# Patient Record
Sex: FEMALE | Race: WHITE
[De-identification: names, ages, dates, MRNs, and addresses within clinical notes are randomized per-mention and may not be internally consistent; named-entity substitution may affect disease eponyms.]

---

## 2018-01-02 ENCOUNTER — HOSPITAL ENCOUNTER (EMERGENCY)
Dept: HOSPITAL 72 - EMR | Age: 78
Discharge: HOME | End: 2018-01-02
Payer: MEDICARE

## 2018-01-02 VITALS — HEIGHT: 65 IN | BODY MASS INDEX: 18.33 KG/M2 | WEIGHT: 110 LBS

## 2018-01-02 VITALS — SYSTOLIC BLOOD PRESSURE: 124 MMHG | DIASTOLIC BLOOD PRESSURE: 76 MMHG

## 2018-01-02 DIAGNOSIS — J18.9: Primary | ICD-10-CM

## 2018-01-02 DIAGNOSIS — M79.7: ICD-10-CM

## 2018-01-02 DIAGNOSIS — G20: ICD-10-CM

## 2018-01-02 DIAGNOSIS — Z88.8: ICD-10-CM

## 2018-01-02 PROCEDURE — 99284 EMERGENCY DEPT VISIT MOD MDM: CPT

## 2018-01-02 PROCEDURE — 71045 X-RAY EXAM CHEST 1 VIEW: CPT

## 2018-01-02 NOTE — EMERGENCY ROOM REPORT
History of Present Illness


General


Chief Complaint:  Upper Respiratory Illness


Source:  Patient


 (JOSE E DAMON)





Present Illness


HPI


The patient is a 78 yo female presenting for cough and subjective fever for the 

past 2 weeks.  She states that she went to another facility and was prescribed 

amoxicillin which ended one week prior.  She states that this initially helped 

and then symptoms returned.  She denies any known sick contacts recent travel.  

She states that she is having difficulty sleeping due to to coughing as well as 

chest pain which occurs with cough only.  Described as a 5/10 dull ache.  She 

denies any other symptoms including shortness of breath, hemoptysis, abdominal 

pain, rash, dizziness, myalgia


 (JOSE E DAMON)


Allergies:  


Coded Allergies:  


     AMOXICILLIN (Verified  Allergy, Severe, RASHES ARMS/CHEST, 6/22/16)


     CLAVULANIC ACID (Verified  Allergy, Severe, RASHES ARMS/CHEST, 6/22/16)


     LEVOFLOXACIN (Verified  Allergy, Severe, RASHES ARMS/CHEST, 6/22/16)


     NITROFURANTOIN (Verified  Allergy, Unknown, 1/2/18)


Uncoded Allergies:  


     SHELLFISH (Allergy, Unknown, Rash, 10/16/16)





Patient History


Past Medical History:  see triage record


Pertinent Family History:  none


Last Menstrual Period:  Post


Reviewed Nursing Documentation:  PMH: Agreed, PSxH: Agreed (JOSE E DAMON)





Nursing Documentation-PMH


Hx Cancer:  No


Hx Gastrointestinal Problems:  Yes - LACTOSE INTOLERANCE


Hx Neurological Problems:  No - Fibromyalgia, Parkinson's


 (JOSE E DAMON)





Review of Systems


All Other Systems:  negative except mentioned in HPI


 (JOSE E DAMON)





Physical Exam





Vital Signs








  Date Time  Temp Pulse Resp B/P (MAP) Pulse Ox O2 Delivery O2 Flow Rate FiO2


 


1/2/18 17:01 99.1 93 19 122/77 98   


 


1/2/18 17:30      Room Air  98








Sp02 EP Interpretation:  reviewed, normal


General Appearance:  no apparent distress, alert, GCS 15, non-toxic


Head:  normocephalic, atraumatic


Eyes:  bilateral eye normal inspection, bilateral eye PERRL


ENT:  hearing grossly normal, normal pharynx, no angioedema, normal voice


Neck:  full range of motion, supple/symm/no masses


Respiratory:  chest non-tender, lungs clear, normal breath sounds, no wheezing, 

speaking full sentences


Cardiovascular #1:  regular rate, rhythm, no edema


Musculoskeletal:  back normal, gait/station normal, normal range of motion, non-

tender


Neurologic:  alert, oriented x3, responsive, motor strength/tone normal, 

sensory intact, speech normal


Psychiatric:  judgement/insight normal, memory normal, mood/affect normal, no 

suicidal/homicidal ideation


Skin:  normal color, no rash, warm/dry, well hydrated


Lymphatic:  no adenopathy


 (JOSE E DAMON P.AJason)





Medical Decision Making


PA Attestation


Dr. Gan is my supervising physician. Patient management was discussed with 

my supervising physician


 (JOSE E DAMON)


Diagnostic Impression:  


 Primary Impression:  


 Atypical pneumonia


ER Course


The patient is a 78 yo female presenting for cough and subjective fever for the 

past 2 weeks. 





Differential diagnosis include but not limited to pharyngitis, sinusitis, AOM, 

bronchitis, PNA





PE: Afebrile. NAD


HEENT unremarkable


Lungs CTA bilat. 





Chest x-ray unremarkable.  No acute findings.





The patient will be treated with a prescription for azithromycin and cough 

medication and will followup with her primary doctor. Will treat for pneumonia 

given patient's age, subjective fever, and length of symptoms. 





ER precautions given


 (JOSE E DAMON P.A.)





Chest X-Ray Diagnostic Results


Chest X-Ray Diagnostic Results :  


   Chest X-Ray Ordered:  Yes


   # of Views/Limited/Complete:  1 View


   Indication:  Other - cough


   EP Interpretation:  Yes


   PA Xray:  Interpretation reviewed, by supervising MD, and agrees with 

findings.


   Interpretation:  no consolidation, no effusion, no pneumothorax


   Impression:  No acute disease


   Electronically Signed by:  Jose E Damon PA-C


 (JOSE E DAMON P.A.)


Chest X-Ray Diagnostic Results :  


   Electronically Signed by:  [place] documentation reviewed by me and is 

accurate, Boyd Gan MD.


 (Boyd Gan M.D.)





Last Vital Signs








  Date Time  Temp Pulse Resp B/P (MAP) Pulse Ox O2 Delivery O2 Flow Rate FiO2


 


1/2/18 18:30 98.8 76 20 124/76 99 Room Air  


 


1/2/18 17:30        98








Status:  improved


 (JOSE E DAMON P.A.)


Disposition:  HOME, SELF-CARE


Condition:  Improved


Scripts


Codeine/Promethazine Hcl* (PROMETHAZINE-CODEINE SYRUP*) 118 Ml Syrup


5 ML ORAL Q6H Y for For Cough, #118 ML 0 Refills


   Prov: JOSE E DAMON         1/2/18 


Azithromycin* (ZITHROMAX*) 250 Mg Tablet


250 MG ORAL DAILY, #6 TAB 0 Refills


   Take two tables once daily for 1 day, then one tablet once daily for 4


   days.


   Prov: JOSE E DAMON         1/2/18


Referrals:  


RONNY ALEX (PCP)


Patient Instructions:  Cough, Adult





Additional Instructions:  


I discussed my findings with the patient. All questions and concerns have been 

answered. Treatment and medication compliance have been addressed. I advised 

the patient that they need to follow up with PMD in 3-5 days. Return to ED if 

pain remains or worsens, cough worsens or remains, you notice blood in your 

sputum, you notice wheezing, you experience a fever, or if needed for any 

reason. Patient verbalized understanding of discharge instructions.











JOSE E DAMON Jan 2, 2018 21:38


Boyd Gan M.D. Chad 3, 2018 04:38

## 2018-01-03 NOTE — DIAGNOSTIC IMAGING REPORT
Indication: Cough

 

Technique: One view of the chest

 

Comparison: none

 

Findings: Lungs and pleural spaces are clear. Heart size is normal. Left

hemidiaphragm is mildly elevated

 

Impression: No acute process

## 2018-01-13 ENCOUNTER — HOSPITAL ENCOUNTER (EMERGENCY)
Dept: HOSPITAL 72 - EMR | Age: 78
Discharge: HOME | End: 2018-01-13
Payer: MEDICARE

## 2018-01-13 VITALS — WEIGHT: 110 LBS | HEIGHT: 64 IN | BODY MASS INDEX: 18.78 KG/M2

## 2018-01-13 VITALS — SYSTOLIC BLOOD PRESSURE: 153 MMHG | DIASTOLIC BLOOD PRESSURE: 81 MMHG

## 2018-01-13 DIAGNOSIS — N39.0: ICD-10-CM

## 2018-01-13 DIAGNOSIS — R07.89: Primary | ICD-10-CM

## 2018-01-13 DIAGNOSIS — M79.7: ICD-10-CM

## 2018-01-13 DIAGNOSIS — G20: ICD-10-CM

## 2018-01-13 LAB
APPEARANCE UR: CLEAR
APTT PPP: (no result) S
GLUCOSE UR STRIP-MCNC: NEGATIVE MG/DL
KETONES UR QL STRIP: NEGATIVE
LEUKOCYTE ESTERASE UR QL STRIP: (no result)
NITRITE UR QL STRIP: NEGATIVE
PH UR STRIP: 7 [PH] (ref 4.5–8)
PROT UR QL STRIP: NEGATIVE
SP GR UR STRIP: 1.01 (ref 1–1.03)
UROBILINOGEN UR-MCNC: NORMAL MG/DL (ref 0–1)

## 2018-01-13 PROCEDURE — 71045 X-RAY EXAM CHEST 1 VIEW: CPT

## 2018-01-13 PROCEDURE — 81003 URINALYSIS AUTO W/O SCOPE: CPT

## 2018-01-13 PROCEDURE — 87181 SC STD AGAR DILUTION PER AGT: CPT

## 2018-01-13 PROCEDURE — 93005 ELECTROCARDIOGRAM TRACING: CPT

## 2018-01-13 PROCEDURE — 87086 URINE CULTURE/COLONY COUNT: CPT

## 2018-01-13 PROCEDURE — 99283 EMERGENCY DEPT VISIT LOW MDM: CPT

## 2018-01-13 NOTE — EMERGENCY ROOM REPORT
History of Present Illness


General


Chief Complaint:  Chest Pain


Source:  Patient





Present Illness


HPI


78 yo female, history of fibromyalgia, presenting with right-sided rib pain.  

Patient was seen in the emergency room about a week ago, was diagnosed with 

pneumonia and was given azithromycin.  Patient states that her fever chills are 

gone.  No runny nose.  Cough has improved.  However now has right-sided rib 

pain she believes from the cough.  Pain is worse with movement and deep 

inspiration.  No shortness of breath.  Has been eating and drinking normally.  

Has a caregiver.also states has had urinary frequency


Allergies:  


Coded Allergies:  


     AMOXICILLIN (Verified  Allergy, Severe, RASHES ARMS/CHEST, 6/22/16)


     CLAVULANIC ACID (Verified  Allergy, Severe, RASHES ARMS/CHEST, 6/22/16)


     LEVOFLOXACIN (Verified  Allergy, Severe, RASHES ARMS/CHEST, 6/22/16)


     NITROFURANTOIN (Verified  Allergy, Unknown, 1/2/18)


Uncoded Allergies:  


     SHELLFISH (Allergy, Unknown, Rash, 10/16/16)





Patient History


Past Medical History:  see triage record


Past Surgical History:  none


Pertinent Family History:  none


Pregnant Now:  No


Reviewed Nursing Documentation:  PMH: Agreed, PSxH: Agreed





Nursing Documentation-PMH


Past Medical History:  No History, Except For


Hx Cancer:  No


Hx Gastrointestinal Problems:  Yes - LACTOSE INTOLERANCE


Hx Neurological Problems:  No - Fibromyalgia, Parkinson's





Review of Systems


All Other Systems:  negative except mentioned in HPI





Physical Exam





Vital Signs








  Date Time  Temp Pulse Resp B/P (MAP) Pulse Ox O2 Delivery O2 Flow Rate FiO2


 


1/13/18 19:16 99.0 93 15 155/87 99 Room Air  








Sp02 EP Interpretation:  reviewed, normal


General Appearance:  other - thin elderly female, appears to be in some pain, 

however speaking complete sentences not in respiratory distress


Head:  normocephalic, atraumatic


Eyes:  bilateral eye normal inspection, bilateral eye PERRL, bilateral eye EOMI


ENT:  normal ENT inspection, normal pharynx, normal voice, moist mucus membranes


Neck:  normal inspection, full range of motion, supple


Respiratory:  normal inspection, lungs clear, normal breath sounds, no 

respiratory distress, no retraction, no wheezing, speaking full sentences, 

chest symmetrical


Cardiovascular #1:  normal inspection, regular rate, rhythm, normal capillary 

refill


Cardiovascular #2:  2+ radial (R), 2+ radial (L)


Gastrointestinal:  normal inspection, non tender, soft, non-distended, no 

guarding


Musculoskeletal:  back normal, normal range of motion, other - +r sided rib 

tenderness


Neurologic:  normal inspection, alert, oriented x3, responsive, motor strength/

tone normal, sensory intact, normal gait, speech normal


Psychiatric:  normal inspection, judgement/insight normal, memory normal


Skin:  normal inspection, normal color, no rash, warm/dry, well hydrated, 

normal turgor





Medical Decision Making


Diagnostic Impression:  


 Primary Impression:  


 Muscular chest pain


 Additional Impression:  


 Urinary tract infection


ER Course


77-year-old female  p/w R rib pain worse w movement





DDX: 


likely musculoskeletal pain





Plan:


pan control, ekg, cxr





ER course:


feels better with meds. also with UTI


she remains nontoxic appearing


not in pain


VSS


dc home





Disposition:


Patient will be discharged to home with caregiver


Strict precautions discussed with patient on when to emergently return to the ED

: this includes worsening/severe chest pain, palpitations, shortness of breath, 

syncopal episodes, fever or chills, which may indicate severe illness. Patient 

verbalized understanding.


Patient instructed to follow up with their PMD within the next 2 days. 


Patient also instructed to follow up with a cardiologist within 2 days for 

possible outpatient stress test. Patient agrees with plan.





Please note that this Emergency Department Report was dictated using [employer] technology software, occasionally this can lead to 

erroneous entry secondary to interpretation by the dictation equipment.





EKG Diagnostic Results


EP Interpretation: Yes


Rate: normal


Rhythm: NSR


ST Segments: No acute changes 


ASA given to patient: no





Rhythm Strip


EP Interpretation: Yes


Rate: 80


Rhythm: NSR, no PVCs, no ectopy





Chest X-ray


CXR: Ordered: Yes


1 view


Indication: Chest pain


EP interpretation: Yes


Interpretation: No consolidation, no effusion, no PTX, no acute cardiopulmonary 

disease


Impression: No acute disease





Electronically signed by Ana Merrill MD





Laboratory Tests








Test


  1/13/18


20:37


 


Urine Color Pale yellow  


 


Urine Appearance Clear  


 


Urine pH 7 (4.5-8.0)  


 


Urine Specific Gravity


  1.010


(1.005-1.035)


 


Urine Protein


  Negative


(NEGATIVE)


 


Urine Glucose (UA)


  Negative


(NEGATIVE)


 


Urine Ketones


  Negative


(NEGATIVE)


 


Urine Occult Blood


  Negative


(NEGATIVE)


 


Urine Nitrite


  Negative


(NEGATIVE)


 


Urine Bilirubin


  Negative


(NEGATIVE)


 


Urine Urobilinogen


  Normal MG/DL


(0.0-1.0)


 


Urine Leukocyte Esterase


  1+ (NEGATIVE)


H


 


Urine RBC


  0-2 /HPF (0 -


2)


 


Urine WBC


  2-4 /HPF (0 -


2)


 


Urine Squamous Epithelial


Cells None /LPF


(NONE/OCC)


 


Urine Amorphous Sediment


  Moderate /LPF


(NONE)  H


 


Urine Bacteria


  Moderate /HPF


(NONE)  H











Last Vital Signs








  Date Time  Temp Pulse Resp B/P (MAP) Pulse Ox O2 Delivery O2 Flow Rate FiO2


 


1/13/18 19:16 99.0 93 15 155/87 99 Room Air  








Disposition:  HOME, SELF-CARE


Condition:  Improved


Scripts


Benzonatate* (TESSALON PERLE*) 100 Mg Capsule


7 MG ORAL THREE TIMES A DAY, #21 PERLE


   Prov: Ana Merrill M.D.         1/13/18 


Cephalexin* (KEFLEX*) 500 Mg Capsule


500 MG ORAL Q6H for 7 Days, #28 CAP 0 Refills


   Prov: Ana Merrill M.D.         1/13/18 


Hydrocodone Bit/Acetaminophen 5-325* (NORCO 5-325*) 1 Each Tablet


1 TAB ORAL Q6H Y for For Pain, #10 TAB 0 Refills


   Prov: Ana Merrill M.D.         1/13/18


Patient Instructions:  Nonspecific Chest Pain











Ana Merrill M.D. Jan 13, 2018 19:49

## 2018-01-14 NOTE — DIAGNOSTIC IMAGING REPORT
Indication: Pain

 

Technique: XRAY Chest 1v

 

Comparison: 1/2/2018

 

Findings: Heart size and mediastinal contours are within normal limits given

technique. There is no focal consolidation, pneumothorax or pleural effusion. Osseous

structures demonstrate no acute abnormality.

 

Impression: No radiographic evidence of acute cardiopulmonary disease.

 

This corresponds with the preliminary interpretation of the treating ER physician as

documented in the electronic medical record.

## 2018-01-18 NOTE — CARDIOLOGY REPORT
--------------- APPROVED REPORT --------------





EKG Measurement

Heart [id number]

[id number]

[id number]

[id number]

[id number]





Normal sinus rhythm

Nonspecific ST abnormality

Abnormal ECG